# Patient Record
Sex: FEMALE | Race: BLACK OR AFRICAN AMERICAN | NOT HISPANIC OR LATINO
[De-identification: names, ages, dates, MRNs, and addresses within clinical notes are randomized per-mention and may not be internally consistent; named-entity substitution may affect disease eponyms.]

---

## 2017-08-16 ENCOUNTER — APPOINTMENT (OUTPATIENT)
Dept: MAMMOGRAPHY | Facility: IMAGING CENTER | Age: 42
End: 2017-08-16

## 2017-09-19 ENCOUNTER — OUTPATIENT (OUTPATIENT)
Dept: OUTPATIENT SERVICES | Facility: HOSPITAL | Age: 42
LOS: 1 days | End: 2017-09-19
Payer: COMMERCIAL

## 2017-09-19 ENCOUNTER — APPOINTMENT (OUTPATIENT)
Dept: MAMMOGRAPHY | Facility: CLINIC | Age: 42
End: 2017-09-19
Payer: COMMERCIAL

## 2017-09-19 DIAGNOSIS — Z00.8 ENCOUNTER FOR OTHER GENERAL EXAMINATION: ICD-10-CM

## 2017-09-19 DIAGNOSIS — Z01.419 ENCOUNTER FOR GYNECOLOGICAL EXAMINATION (GENERAL) (ROUTINE) WITHOUT ABNORMAL FINDINGS: ICD-10-CM

## 2017-09-19 PROCEDURE — 77063 BREAST TOMOSYNTHESIS BI: CPT | Mod: 26

## 2017-09-19 PROCEDURE — G0202: CPT | Mod: 26

## 2017-09-19 PROCEDURE — 77063 BREAST TOMOSYNTHESIS BI: CPT

## 2017-09-19 PROCEDURE — 77067 SCR MAMMO BI INCL CAD: CPT

## 2017-10-02 ENCOUNTER — EMERGENCY (EMERGENCY)
Facility: HOSPITAL | Age: 42
LOS: 1 days | Discharge: HOME | End: 2017-10-02
Attending: EMERGENCY MEDICINE | Admitting: EMERGENCY MEDICINE
Payer: COMMERCIAL

## 2017-10-02 VITALS
HEART RATE: 81 BPM | DIASTOLIC BLOOD PRESSURE: 97 MMHG | TEMPERATURE: 98 F | RESPIRATION RATE: 16 BRPM | OXYGEN SATURATION: 100 % | SYSTOLIC BLOOD PRESSURE: 139 MMHG

## 2017-10-02 LAB
APPEARANCE UR: CLEAR — SIGNIFICANT CHANGE UP
BILIRUB UR-MCNC: NEGATIVE — SIGNIFICANT CHANGE UP
BLOOD UR QL VISUAL: NEGATIVE — SIGNIFICANT CHANGE UP
COLOR SPEC: YELLOW — SIGNIFICANT CHANGE UP
GLUCOSE UR-MCNC: NEGATIVE — SIGNIFICANT CHANGE UP
HCG UR-SCNC: NEGATIVE — SIGNIFICANT CHANGE UP
KETONES UR-MCNC: NEGATIVE — SIGNIFICANT CHANGE UP
LEUKOCYTE ESTERASE UR-ACNC: NEGATIVE — SIGNIFICANT CHANGE UP
MUCOUS THREADS # UR AUTO: SIGNIFICANT CHANGE UP
NITRITE UR-MCNC: NEGATIVE — SIGNIFICANT CHANGE UP
PH UR: 6 — SIGNIFICANT CHANGE UP (ref 4.6–8)
PROT UR-MCNC: NEGATIVE — SIGNIFICANT CHANGE UP
RBC CASTS # UR COMP ASSIST: SIGNIFICANT CHANGE UP (ref 0–?)
SP GR SPEC: 1.02 — SIGNIFICANT CHANGE UP (ref 1–1.03)
SP GR UR: 1.01 — SIGNIFICANT CHANGE UP (ref 1–1.03)
SQUAMOUS # UR AUTO: SIGNIFICANT CHANGE UP
UROBILINOGEN FLD QL: NORMAL E.U. — SIGNIFICANT CHANGE UP (ref 0.1–0.2)

## 2017-10-02 PROCEDURE — 99284 EMERGENCY DEPT VISIT MOD MDM: CPT

## 2017-10-02 PROCEDURE — 76830 TRANSVAGINAL US NON-OB: CPT | Mod: 26

## 2017-10-02 RX ORDER — METRONIDAZOLE 500 MG
1 TABLET ORAL
Qty: 10 | Refills: 0 | OUTPATIENT
Start: 2017-10-02 | End: 2017-10-07

## 2017-10-02 RX ORDER — FLUCONAZOLE 150 MG/1
150 TABLET ORAL ONCE
Qty: 0 | Refills: 0 | Status: COMPLETED | OUTPATIENT
Start: 2017-10-02 | End: 2017-10-02

## 2017-10-02 RX ADMIN — FLUCONAZOLE 150 MILLIGRAM(S): 150 TABLET ORAL at 17:16

## 2017-10-02 NOTE — ED PROVIDER NOTE - PHYSICAL EXAMINATION
*GEN:   mildly comfortable, AOx3    ///    *EYES:   PERRL, extra-occular movements intact    ///    *HEENT:   airway patent, moist mucosal membranes    ///    *CV:   regular rate and rhythm, normal S1/S2    ///    *RESP:   clear to auscultation bilaterally, non-labored    ///    *ABD:   TENDER LOW MID ABD AND PELVIS, soft, no guarding    ///    *:   no cva tenderness    ///    *MSK:   no musculoskeletal tenderness    ///    *SKIN:   dry, intact    ///    *NEURO:   AOx3, no focal weakness or loss of sensation, gait normal

## 2017-10-02 NOTE — ED PROVIDER NOTE - ATTENDING CONTRIBUTION TO CARE
43yo F PMH: anemia, recurrent uti, bacterial vaginosis, miscarriages s/p tubal ligation p/w worsening low abd / pelvic pressure-like pain x 2 wks.   On exam awake & alert, lungs CTAB no wheeze no crackle, RRR, abdomen soft suprapubic tenderness no rebound no guarding,  , 2+ pulses b/l, neuro A&Ox3, no focal deficits,   skin warm and dry no rash

## 2017-10-02 NOTE — ED PROVIDER NOTE - OBJECTIVE STATEMENT
43 yo  F w/ pmh anemia, recurrent uti, bacterial vaginosis, miscarriages s/p tubal ligation p/w worsening low abd / pelvic pressure-like pain x 2 wks. Pt reports 'smelly urine', frequency, and urinary urge. Pt denies dysuria, hematuria, n/v/d, f/c, flank pain, chest pain, vag discharge. Pt has not taken any pain meds, not taking any meds. 2017 pt was DC from admit for vaginal bleed, denies current sx. PCP saw ob 4 days ago and received US, but has not heard results yet.    PCP: Dr. Rusty Pryor  OB: Dr. Temitope Ruiz    LMP 2017, was normal.

## 2017-10-02 NOTE — ED ADULT TRIAGE NOTE - CHIEF COMPLAINT QUOTE
Pt c/o lower abd/pelvic pain x 2 weeks, was recently told she had "fat on my kidney", then went to OB and had an ultrasound with no results yet. Denies any other associated symptoms.

## 2017-10-02 NOTE — ED PROVIDER NOTE - PROGRESS NOTE DETAILS
Dr. Mynor Carlin PGY1: ua negative, pt stable, pelvic / speculum exam performed w/ visualization of large volume white discharge within vaginal canal and obscuring cervical os, transvaginal us ordered to r/o structural etiology Dr. Mynor Carlin PGY1: us negative, presumed vaginitis presumed vaginal candidiasis vs BV, will receive fluconazole and dc w/ flagyl 500 bid x 5 days

## 2017-10-02 NOTE — ED PROVIDER NOTE - PMH
Anemia    Cervical incompetence, antepartum    Ear infection    H/O molar pregnancy, antepartum x 1    History of miscarriage  x2  History of miscarriage x 2    IUP (intrauterine pregnancy), incidental

## 2017-10-03 LAB — SPECIMEN SOURCE: SIGNIFICANT CHANGE UP

## 2017-10-04 LAB — BACTERIA UR CULT: SIGNIFICANT CHANGE UP
